# Patient Record
Sex: MALE | NOT HISPANIC OR LATINO | Employment: OTHER | URBAN - NONMETROPOLITAN AREA
[De-identification: names, ages, dates, MRNs, and addresses within clinical notes are randomized per-mention and may not be internally consistent; named-entity substitution may affect disease eponyms.]

---

## 2018-02-08 ENCOUNTER — HISTORICAL (OUTPATIENT)
Dept: ADMINISTRATIVE | Facility: HOSPITAL | Age: 83
End: 2018-02-08

## 2019-01-15 ENCOUNTER — HISTORICAL (OUTPATIENT)
Dept: ADMINISTRATIVE | Facility: HOSPITAL | Age: 84
End: 2019-01-15

## 2019-08-08 ENCOUNTER — HISTORICAL (OUTPATIENT)
Dept: ADMINISTRATIVE | Facility: HOSPITAL | Age: 84
End: 2019-08-08

## 2019-11-12 RX ORDER — LORATADINE 10 MG/1
10 TABLET ORAL DAILY
COMMUNITY

## 2019-11-12 RX ORDER — LISINOPRIL 2.5 MG/1
2.5 TABLET ORAL DAILY
COMMUNITY

## 2019-11-12 RX ORDER — METOPROLOL TARTRATE 25 MG/1
25 TABLET, FILM COATED ORAL 2 TIMES DAILY
COMMUNITY

## 2019-11-12 RX ORDER — FAMOTIDINE 20 MG/1
20 TABLET, FILM COATED ORAL 2 TIMES DAILY
COMMUNITY

## 2019-11-12 RX ORDER — CITALOPRAM 10 MG/1
10 TABLET ORAL DAILY
COMMUNITY

## 2019-11-12 RX ORDER — POTASSIUM CHLORIDE 750 MG/1
10 TABLET, EXTENDED RELEASE ORAL ONCE
COMMUNITY

## 2019-11-12 RX ORDER — FUROSEMIDE 20 MG/1
20 TABLET ORAL 2 TIMES DAILY
COMMUNITY

## 2019-11-12 RX ORDER — WARFARIN 6 MG/1
6 TABLET ORAL DAILY
COMMUNITY

## 2019-11-12 RX ORDER — TAMSULOSIN HYDROCHLORIDE 0.4 MG/1
0.4 CAPSULE ORAL DAILY
COMMUNITY

## 2019-11-12 RX ORDER — ATORVASTATIN CALCIUM 80 MG/1
80 TABLET, FILM COATED ORAL DAILY
COMMUNITY

## 2019-11-12 RX ORDER — LEVOTHYROXINE SODIUM 25 UG/1
25 TABLET ORAL DAILY
COMMUNITY

## 2019-11-12 RX ORDER — WARFARIN 2.5 MG/1
2.5 TABLET ORAL DAILY
COMMUNITY

## 2019-11-12 RX ORDER — DONEPEZIL HYDROCHLORIDE 10 MG/1
10 TABLET, FILM COATED ORAL NIGHTLY
COMMUNITY

## 2019-11-12 RX ORDER — LORAZEPAM 2 MG/1
0.5 TABLET ORAL EVERY 6 HOURS PRN
COMMUNITY

## 2019-11-12 RX ORDER — OLANZAPINE 2.5 MG/1
2.5 TABLET ORAL NIGHTLY
COMMUNITY

## 2019-11-13 ENCOUNTER — OFFICE VISIT (OUTPATIENT)
Dept: UROLOGY | Facility: CLINIC | Age: 84
End: 2019-11-13
Payer: MEDICARE

## 2019-11-13 VITALS — DIASTOLIC BLOOD PRESSURE: 70 MMHG | SYSTOLIC BLOOD PRESSURE: 115 MMHG | HEART RATE: 67 BPM

## 2019-11-13 DIAGNOSIS — N39.0 URINARY TRACT INFECTION WITHOUT HEMATURIA, SITE UNSPECIFIED: ICD-10-CM

## 2019-11-13 DIAGNOSIS — N40.1 BPH WITH OBSTRUCTION/LOWER URINARY TRACT SYMPTOMS: ICD-10-CM

## 2019-11-13 DIAGNOSIS — N13.8 BPH WITH OBSTRUCTION/LOWER URINARY TRACT SYMPTOMS: ICD-10-CM

## 2019-11-13 DIAGNOSIS — R33.9 INCOMPLETE BLADDER EMPTYING: Primary | ICD-10-CM

## 2019-11-13 LAB
ANION GAP SERPL CALC-SCNC: 8 MMOL/L (ref 3–11)
BUN SERPL-MCNC: 81 MG/DL (ref 7–18)
CALCIUM SERPL-MCNC: 9.3 MG/DL (ref 8.5–10.1)
CHLORIDE SERPL-SCNC: 93 MMOL/L (ref 98–107)
CO2 SERPL-SCNC: 38 MMOL/L (ref 21–32)
CREAT SERPL-MCNC: 1.33 MG/DL (ref 0.7–1.3)
GFR ESTIMATION: 50
GLUCOSE SERPL-MCNC: 114 MG/DL (ref 74–106)
POC RESIDUAL URINE VOLUME: 548 ML (ref 0–100)
POTASSIUM SERPL-SCNC: 3.7 MMOL/L (ref 3.5–5.1)
SODIUM BLD-SCNC: 139 MMOL/L (ref 131–143)

## 2019-11-13 PROCEDURE — 99214 OFFICE O/P EST MOD 30 MIN: CPT | Mod: S$GLB,,, | Performed by: NURSE PRACTITIONER

## 2019-11-13 PROCEDURE — 99214 PR OFFICE/OUTPT VISIT, EST, LEVL IV, 30-39 MIN: ICD-10-PCS | Mod: S$GLB,,, | Performed by: NURSE PRACTITIONER

## 2019-11-13 PROCEDURE — 51798 POCT BLADDER SCAN: ICD-10-PCS | Mod: S$GLB,,, | Performed by: NURSE PRACTITIONER

## 2019-11-13 PROCEDURE — 51798 US URINE CAPACITY MEASURE: CPT | Mod: S$GLB,,, | Performed by: NURSE PRACTITIONER

## 2019-11-13 NOTE — PROGRESS NOTES
Subjective:       Patient ID: Lemuel Boudreaux is a 89 y.o. male.    Chief Complaint: No chief complaint on file.      HPI: A 89-year-old Zeyad demented  male seen on follow-up today for incomplete bladder emptying.  On Flomax.  Unable to contribute to subjective interview.  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    B-COMPLEX WITH VITAMIN C (VITAMIN B COMPLEX-C ORAL)    Take by mouth.    CITALOPRAM (CELEXA) 10 MG TABLET    Take 10 mg by mouth once daily.    DONEPEZIL (ARICEPT) 10 MG TABLET    Take 10 mg by mouth every evening.    FAMOTIDINE (PEPCID) 20 MG TABLET    Take 20 mg by mouth 2 (two) times daily.    FUROSEMIDE (LASIX) 20 MG TABLET    Take 20 mg by mouth 2 (two) times daily.    LEVOTHYROXINE (SYNTHROID) 25 MCG TABLET    Take 25 mcg by mouth once daily.    LISINOPRIL (PRINIVIL,ZESTRIL) 2.5 MG TABLET    Take 2.5 mg by mouth once daily.    LORATADINE (CLARITIN) 10 MG TABLET    Take 10 mg by mouth once daily.    LORAZEPAM (ATIVAN) 2 MG TAB    Take 0.5 mg by mouth every 6 (six) hours as needed.    METOPROLOL TARTRATE (LOPRESSOR) 25 MG TABLET    Take 25 mg by mouth 2 (two) times daily.    OLANZAPINE (ZYPREXA) 2.5 MG TABLET    Take 2.5 mg by mouth every evening.    ONDANSETRON HCL (ZOFRAN ORAL)    Take by mouth.    POTASSIUM CHLORIDE (KLOR-CON) 10 MEQ TBSR    Take 10 mEq by mouth once.     SERTRALINE HCL (ZOLOFT ORAL)    Take by mouth.    TAMSULOSIN (FLOMAX) 0.4 MG CAP    Take 0.4 mg by mouth once daily.    WARFARIN (COUMADIN) 2.5 MG TABLET    Take 2.5 mg by mouth once daily.    WARFARIN (COUMADIN) 6 MG TABLET    Take 6 mg by mouth once daily.        Past Social History:   Social History     Socioeconomic History    Marital status:      Spouse name: Not on file    Number of children: Not on file    Years of education: Not on file    Highest education level: Not on file   Occupational History    Not on file   Social Needs    Financial resource strain: Not on file    Food insecurity:     Worry: Not on file     Inability: Not on file    Transportation needs:     Medical: Not on file     Non-medical: Not on file   Tobacco Use    Smoking status: Never Smoker   Substance and Sexual Activity    Alcohol use: Not Currently    Drug use: Not on file    Sexual activity: Not on file   Lifestyle    Physical activity:     Days per week: Not on file     Minutes per session: Not on file    Stress: Not on file   Relationships    Social connections:     Talks on phone: Not on file     Gets together: Not on file     Attends Holiness service: Not on file     Active member of club or organization: Not on file     Attends meetings of clubs or organizations: Not on file     Relationship status: Not on file   Other Topics Concern    Not on file   Social History Narrative    Not on file       Allergies:   Review of patient's allergies indicates:   Allergen Reactions    Cefdinir         Family History: History reviewed. No pertinent family history.     Review of Systems:  Review of Systems   Constitutional: Negative for activity change and appetite change.   HENT: Negative for congestion and dental problem.    Respiratory: Negative for chest tightness and shortness of breath.    Cardiovascular: Negative for chest pain.   Gastrointestinal: Negative for abdominal distention and abdominal pain.    Genitourinary: Negative for decreased urine volume, difficulty urinating, discharge, dysuria, enuresis, flank pain, frequency, genital sores, hematuria, penile pain, penile swelling, scrotal swelling, testicular pain and urgency.   Musculoskeletal: Negative for back pain and neck pain.   Neurological: Negative for dizziness.   Hematological: Negative for adenopathy.   Psychiatric/Behavioral: Negative for agitation, behavioral problems and confusion.       Physical Exam:  Physical Exam   Constitutional: He is oriented to person, place, and time. He appears well-developed and well-nourished.   HENT:   Head: Normocephalic.   Eyes: No scleral icterus.   Neck: Normal range of motion.   Cardiovascular: Intact distal pulses.    Pulmonary/Chest: Effort normal and breath sounds normal.   Abdominal: Soft. He exhibits no distension. There is no tenderness. No hernia. Hernia confirmed negative in the right inguinal area and confirmed negative in the left inguinal area.   Genitourinary: Testes normal and penis normal. Cremasteric reflex is present.   Neurological: He is alert and oriented to person, place, and time.   Skin: Skin is warm and dry.     Psychiatric: His behavior is normal. Cognition and memory are impaired (Dementia, hearing loss; unable to contribute 2 subjective interview).       Assessment/Plan:       Problem List Items Addressed This Visit        Renal/    Incomplete bladder emptying - Primary    Current Assessment & Plan     Rising postvoid residual now up to 548 cc.  Order given nursing home staff to insert 14-16 French indwelling Villavicencio to  bag.  [Office environment not suitable to safely handle patient out of wheelchair onto table for catheterization insert here.]  Will re-evaluate patient 1 month and electrodes office, sooner as needed.  BMP today         UTI (urinary tract infection)    Current Assessment & Plan     Nitrite negative, microscopically WBC too numerous to count RBC 0 to 3-for episode 4+  bacteria on a voided specimen culture urine         BPH with obstruction/lower urinary tract symptoms    Current Assessment & Plan     On Flomax with poor outcome, rising PVR.

## 2019-11-13 NOTE — ASSESSMENT & PLAN NOTE
Rising postvoid residual now up to 548 cc.  Order given nursing home staff to insert 14-16 French indwelling Villavicencio to  bag.  [Office environment not suitable to safely handle patient out of wheelchair onto table for catheterization insert here.]  Will re-evaluate patient 1 month and electrodes office, sooner as needed.  BMP today

## 2019-11-13 NOTE — ASSESSMENT & PLAN NOTE
Nitrite negative, microscopically WBC too numerous to count RBC 0 to 3-for episode 4+ bacteria on a voided specimen; culture urine, patient afebrile asymptomatic will hold antibiotics pending sensitivities

## 2019-11-21 ENCOUNTER — TELEPHONE (OUTPATIENT)
Dept: UROLOGY | Facility: CLINIC | Age: 84
End: 2019-11-21

## 2019-11-22 NOTE — TELEPHONE ENCOUNTER
----- Message from Enoc Kendrick NP sent at 11/14/2019 11:53 AM CST -----  Please call the patient regarding his abnormal result.  Notify care facility of abnormal findings of basic metabolic panel, patient was in urinary retention, forward to PCP for further evaluation and treatment if indicated

## 2019-12-17 ENCOUNTER — OFFICE VISIT (OUTPATIENT)
Dept: UROLOGY | Facility: CLINIC | Age: 84
End: 2019-12-17
Payer: MEDICARE

## 2019-12-17 VITALS — RESPIRATION RATE: 18 BRPM | BODY MASS INDEX: 21.76 KG/M2 | WEIGHT: 152 LBS | HEART RATE: 76 BPM | HEIGHT: 70 IN

## 2019-12-17 DIAGNOSIS — N40.1 BPH WITH OBSTRUCTION/LOWER URINARY TRACT SYMPTOMS: ICD-10-CM

## 2019-12-17 DIAGNOSIS — R33.9 INCOMPLETE BLADDER EMPTYING: Primary | ICD-10-CM

## 2019-12-17 DIAGNOSIS — R82.90 ABNORMAL URINALYSIS: ICD-10-CM

## 2019-12-17 DIAGNOSIS — Z97.8 PRESENCE OF INDWELLING FOLEY CATHETER: ICD-10-CM

## 2019-12-17 DIAGNOSIS — N13.8 BPH WITH OBSTRUCTION/LOWER URINARY TRACT SYMPTOMS: ICD-10-CM

## 2019-12-17 LAB
BILIRUB UR QL STRIP: NEGATIVE
GLUCOSE UR QL STRIP: NEGATIVE
KETONES UR QL STRIP: NEGATIVE
LEUKOCYTE ESTERASE UR QL STRIP: POSITIVE
PH, POC UA: 7
POC AMORP, URINE: ABNORMAL
POC BACTI, URINE: ABNORMAL
POC BLOOD, URINE: POSITIVE
POC CASTS, URINE: ABNORMAL
POC CRYST, URINE: ABNORMAL
POC EPITH, URINE: ABNORMAL
POC HCG, URINE: ABNORMAL
POC HYALIN, URINE: ABNORMAL
POC MUCUS, URINE: ABNORMAL
POC NITRATES, URINE: NEGATIVE
POC OTHER, URINE: ABNORMAL
POC RBC, URINE: ABNORMAL HPF
POC WBC, URINE: ABNORMAL HPF
PROT UR QL STRIP: POSITIVE
SP GR UR STRIP: 1.01 (ref 1–1.03)
UROBILINOGEN UR STRIP-ACNC: 0.2 (ref 0.3–2.2)

## 2019-12-17 PROCEDURE — 1159F PR MEDICATION LIST DOCUMENTED IN MEDICAL RECORD: ICD-10-PCS | Mod: S$GLB,,, | Performed by: NURSE PRACTITIONER

## 2019-12-17 PROCEDURE — 99213 PR OFFICE/OUTPT VISIT, EST, LEVL III, 20-29 MIN: ICD-10-PCS | Mod: S$GLB,,, | Performed by: NURSE PRACTITIONER

## 2019-12-17 PROCEDURE — 1159F MED LIST DOCD IN RCRD: CPT | Mod: S$GLB,,, | Performed by: NURSE PRACTITIONER

## 2019-12-17 PROCEDURE — 99213 OFFICE O/P EST LOW 20 MIN: CPT | Mod: S$GLB,,, | Performed by: NURSE PRACTITIONER

## 2019-12-17 NOTE — PROGRESS NOTES
Subjective:       Patient ID: Lemuel Boudreaux is a 89 y.o. male.    Chief Complaint: Follow-up (1 mth f/u)      HPI: 89-year-old male presents for 1 month re-evaluation.  Patient was seen 1 month ago, secondary to incomplete bladder emptying and BPH with obstruction, as well as urinary retention.  Patient had a bladder scan at that time 548 cc.  Patient had had a Villavicencio placed prior, was DC'd but the patient was unable to void so Villavicencio was put back in and has been there since.  Patient is on Flomax 1 a day.  Patient and caretaker denies odor to urine.  Patient does state the Villavicencio is uncomfortable.  Denies blood in the bag.  No other urinary complaints.  All the problems are stable.      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    B-COMPLEX WITH VITAMIN C (VITAMIN B COMPLEX-C ORAL)    Take by mouth.    CITALOPRAM (CELEXA) 10 MG TABLET    Take 10 mg by mouth once daily.    DONEPEZIL (ARICEPT) 10 MG TABLET    Take 10 mg by mouth every evening.    FAMOTIDINE (PEPCID) 20 MG TABLET    Take 20 mg by mouth 2 (two) times daily.    FUROSEMIDE (LASIX) 20 MG TABLET    Take 20 mg by mouth 2 (two) times daily.    LEVOTHYROXINE (SYNTHROID) 25 MCG TABLET    Take 25 mcg by mouth once daily.    LISINOPRIL (PRINIVIL,ZESTRIL) 2.5 MG TABLET    Take 2.5 mg by mouth once daily.    LORATADINE (CLARITIN) 10 MG TABLET    Take 10 mg by mouth once daily.    LORAZEPAM  (ATIVAN) 2 MG TAB    Take 0.5 mg by mouth every 6 (six) hours as needed.    METOPROLOL TARTRATE (LOPRESSOR) 25 MG TABLET    Take 25 mg by mouth 2 (two) times daily.    OLANZAPINE (ZYPREXA) 2.5 MG TABLET    Take 2.5 mg by mouth every evening.    ONDANSETRON HCL (ZOFRAN ORAL)    Take by mouth.    POTASSIUM CHLORIDE (KLOR-CON) 10 MEQ TBSR    Take 10 mEq by mouth once.    SERTRALINE HCL (ZOLOFT ORAL)    Take by mouth.    TAMSULOSIN (FLOMAX) 0.4 MG CAP    Take 0.4 mg by mouth once daily.    WARFARIN (COUMADIN) 2.5 MG TABLET    Take 2.5 mg by mouth once daily.    WARFARIN (COUMADIN) 6 MG TABLET    Take 6 mg by mouth once daily.        Past Social History:   Social History     Socioeconomic History    Marital status:      Spouse name: Not on file    Number of children: Not on file    Years of education: Not on file    Highest education level: Not on file   Occupational History    Not on file   Social Needs    Financial resource strain: Not on file    Food insecurity:     Worry: Not on file     Inability: Not on file    Transportation needs:     Medical: Not on file     Non-medical: Not on file   Tobacco Use    Smoking status: Never Smoker   Substance and Sexual Activity    Alcohol use: Not Currently    Drug use: Not on file    Sexual activity: Not on file   Lifestyle    Physical activity:     Days per week: Not on file     Minutes per session: Not on file    Stress: Not on file   Relationships    Social connections:     Talks on phone: Not on file     Gets together: Not on file     Attends Adventism service: Not on file     Active member of club or organization: Not on file     Attends meetings of clubs or organizations: Not on file     Relationship status: Not on file   Other Topics Concern    Not on file   Social History Narrative    Not on file       Allergies:   Review of patient's allergies indicates:   Allergen Reactions    Cefdinir         Family History: History reviewed. No pertinent  family history.     Review of Systems:  Review of Systems   Constitutional: Negative for activity change and appetite change.   HENT: Negative for congestion and dental problem.    Respiratory: Negative for chest tightness and shortness of breath.    Cardiovascular: Negative for chest pain.   Gastrointestinal: Negative for abdominal distention and abdominal pain.   Genitourinary: Negative for decreased urine volume, difficulty urinating, discharge, dysuria, enuresis, flank pain, frequency, genital sores, hematuria, penile pain, penile swelling, scrotal swelling, testicular pain and urgency.   Musculoskeletal: Negative for back pain and neck pain.   Neurological: Negative for dizziness.   Hematological: Negative for adenopathy.   Psychiatric/Behavioral: Negative for agitation, behavioral problems and confusion.       Physical Exam:  Physical Exam   Nursing note and vitals reviewed.  Constitutional: He is oriented to person, place, and time. He appears well-developed and well-nourished.   Patient is hard of hearing, and has dementia.   HENT:   Head: Normocephalic.   Cardiovascular: Normal rate, regular rhythm and normal heart sounds.    Pulmonary/Chest: Effort normal and breath sounds normal.   Abdominal: Soft. Bowel sounds are normal.   Neurological: He is alert and oriented to person, place, and time.   Skin: Skin is warm and dry.      Urinalysis:  White blood cells 20/30, leukocytes moderate, epithelials 1+, bacteria 2+.  Blood small, red blood cells 5-10    Assessment/Plan:   1.  Incomplete bladder doing with BPH with obstruction:  Will continue with the Villavicencio.  [facility]  Can continue Flomax as directed.  2.  Abnormal urinalysis:  Will culture the urine and treat appropriately.  Will plan follow-up in 3 months, sooner if needed.  Problem List Items Addressed This Visit        Renal/    Incomplete bladder emptying - Primary    BPH with obstruction/lower  urinary tract symptoms      Other Visit Diagnoses     Presence of indwelling Villavicencio catheter        Relevant Orders    POCT Urinalysis w/ Microscope    Abnormal urinalysis        Relevant Orders    Urine culture

## 2019-12-20 ENCOUNTER — TELEPHONE (OUTPATIENT)
Dept: UROLOGY | Facility: CLINIC | Age: 84
End: 2019-12-20

## 2019-12-20 RX ORDER — SULFAMETHOXAZOLE AND TRIMETHOPRIM 800; 160 MG/1; MG/1
1 TABLET ORAL 2 TIMES DAILY
Qty: 14 TABLET | Refills: 0
Start: 2019-12-20 | End: 2019-12-27

## 2020-03-18 ENCOUNTER — OFFICE VISIT (OUTPATIENT)
Dept: UROLOGY | Facility: CLINIC | Age: 85
End: 2020-03-18
Payer: MEDICARE

## 2020-03-18 VITALS — SYSTOLIC BLOOD PRESSURE: 115 MMHG | DIASTOLIC BLOOD PRESSURE: 74 MMHG | HEART RATE: 68 BPM

## 2020-03-18 DIAGNOSIS — R33.9 INCOMPLETE BLADDER EMPTYING: ICD-10-CM

## 2020-03-18 DIAGNOSIS — R82.71 BACILLURIA: Primary | ICD-10-CM

## 2020-03-18 LAB
BILIRUB UR QL STRIP: NEGATIVE
GLUCOSE UR QL STRIP: NEGATIVE
KETONES UR QL STRIP: NEGATIVE
LEUKOCYTE ESTERASE UR QL STRIP: POSITIVE
PH, POC UA: >=9
POC AMORP, URINE: ABNORMAL
POC BACTI, URINE: ABNORMAL
POC BLOOD, URINE: POSITIVE
POC CASTS, URINE: ABNORMAL
POC CRYST, URINE: ABNORMAL
POC EPITH, URINE: ABNORMAL
POC HCG, URINE: ABNORMAL
POC HYALIN, URINE: ABNORMAL LPF
POC MUCUS, URINE: ABNORMAL
POC NITRATES, URINE: NEGATIVE
POC OTHER, URINE: ABNORMAL
POC RBC, URINE: ABNORMAL HPF
POC WBC, URINE: ABNORMAL HPF
PROT UR QL STRIP: NEGATIVE
SP GR UR STRIP: 1.01 (ref 1–1.03)
UROBILINOGEN UR STRIP-ACNC: 0.2 (ref 0.3–2.2)

## 2020-03-18 PROCEDURE — 99213 PR OFFICE/OUTPT VISIT, EST, LEVL III, 20-29 MIN: ICD-10-PCS | Mod: S$GLB,,, | Performed by: UROLOGY

## 2020-03-18 PROCEDURE — 99213 OFFICE O/P EST LOW 20 MIN: CPT | Mod: S$GLB,,, | Performed by: UROLOGY

## 2020-03-18 NOTE — PROGRESS NOTES
Subjective:       Patient ID: Lemuel Boudreaux is a 89 y.o. male.    Chief Complaint: Other (3 month fu )      HPI: 89-year-old frail male known service Dr. Bela baldwin with history of incomplete bladder emptying.  Positive for indwelling Villavicencio catheter  bag.  Presents today with no complaints.  There is no blood in the bag patient has been afebrile and per caregiver present he has done real well over the last month or so.  No unexplained weight loss.  Appetite is good.  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    B-COMPLEX WITH VITAMIN C (VITAMIN B COMPLEX-C ORAL)    Take by mouth.    CITALOPRAM (CELEXA) 10 MG TABLET    Take 10 mg by mouth once daily.    DONEPEZIL (ARICEPT) 10 MG TABLET    Take 10 mg by mouth every evening.    FAMOTIDINE (PEPCID) 20 MG TABLET    Take 20 mg by mouth 2 (two) times daily.    FUROSEMIDE (LASIX) 20 MG TABLET    Take 20 mg by mouth 2 (two) times daily.    LEVOTHYROXINE (SYNTHROID) 25 MCG TABLET    Take 25 mcg by mouth once daily.    LISINOPRIL (PRINIVIL,ZESTRIL) 2.5 MG TABLET    Take 2.5 mg by mouth once daily.    LORATADINE (CLARITIN) 10 MG TABLET    Take 10 mg by mouth once daily.    LORAZEPAM (ATIVAN) 2 MG TAB    Take 0.5 mg by mouth every 6 (six) hours as needed.    METOPROLOL TARTRATE (LOPRESSOR) 25 MG TABLET    Take 25 mg by mouth 2 (two) times daily.     OLANZAPINE (ZYPREXA) 2.5 MG TABLET    Take 2.5 mg by mouth every evening.    ONDANSETRON HCL (ZOFRAN ORAL)    Take by mouth.    POTASSIUM CHLORIDE (KLOR-CON) 10 MEQ TBSR    Take 10 mEq by mouth once.    SERTRALINE HCL (ZOLOFT ORAL)    Take by mouth.    TAMSULOSIN (FLOMAX) 0.4 MG CAP    Take 0.4 mg by mouth once daily.    WARFARIN (COUMADIN) 2.5 MG TABLET    Take 2.5 mg by mouth once daily.    WARFARIN (COUMADIN) 6 MG TABLET    Take 6 mg by mouth once daily.        Past Social History:   Social History     Socioeconomic History    Marital status:      Spouse name: Not on file    Number of children: Not on file    Years of education: Not on file    Highest education level: Not on file   Occupational History    Not on file   Social Needs    Financial resource strain: Not on file    Food insecurity:     Worry: Not on file     Inability: Not on file    Transportation needs:     Medical: Not on file     Non-medical: Not on file   Tobacco Use    Smoking status: Never Smoker   Substance and Sexual Activity    Alcohol use: Not Currently    Drug use: Not on file    Sexual activity: Not on file   Lifestyle    Physical activity:     Days per week: Not on file     Minutes per session: Not on file    Stress: Not on file   Relationships    Social connections:     Talks on phone: Not on file     Gets together: Not on file     Attends Pentecostalism service: Not on file     Active member of club or organization: Not on file     Attends meetings of clubs or organizations: Not on file     Relationship status: Not on file   Other Topics Concern    Not on file   Social History Narrative    Not on file       Allergies:   Review of patient's allergies indicates:   Allergen Reactions    Cefdinir         Family History: History reviewed. No pertinent family history.     Review of Systems:  Review of Systems   Constitutional: Negative for activity change and appetite change.   HENT: Negative for congestion and dental  problem.    Respiratory: Negative for chest tightness and shortness of breath.    Cardiovascular: Negative for chest pain.   Gastrointestinal: Negative for abdominal distention and abdominal pain.   Genitourinary: Negative for decreased urine volume, difficulty urinating, discharge, dysuria, enuresis, flank pain, frequency, genital sores, hematuria, penile pain, penile swelling, scrotal swelling, testicular pain and urgency.   Musculoskeletal: Negative for back pain and neck pain.   Neurological: Negative for dizziness.   Hematological: Negative for adenopathy.   Psychiatric/Behavioral: Negative for agitation, behavioral problems and confusion.       Physical Exam:  Physical Exam   Constitutional: He is oriented to person, place, and time. He appears well-developed and well-nourished.   HENT:   Head: Normocephalic.   Eyes: No scleral icterus.   Neck: Normal range of motion.   Cardiovascular: Intact distal pulses.    Pulmonary/Chest: Effort normal and breath sounds normal.   Abdominal: Soft. He exhibits no distension. There is no tenderness. No hernia. Hernia confirmed negative in the right inguinal area and confirmed negative in the left inguinal area.   Genitourinary: Testes normal and penis normal. Cremasteric reflex is present.   Neurological: He is alert and oriented to person, place, and time.   Skin: Skin is warm and dry.     Psychiatric: He has a normal mood and affect.       Assessment/Plan:     incomplete bladder emptying--suspect hypotonic bladder.  Patient has indwelling Villavicencio and tolerating well.  Urinalysis 3-5 wbc's, 3-5 rbc's and 1+ bacteria will culture treat if indicated.  Return to clinic 3 months sooner as needed  Problem List Items Addressed This Visit     None

## 2020-03-20 ENCOUNTER — TELEPHONE (OUTPATIENT)
Dept: UROLOGY | Facility: CLINIC | Age: 85
End: 2020-03-20

## 2020-03-20 LAB — URINE CULTURE, ROUTINE: NORMAL

## 2020-03-20 NOTE — TELEPHONE ENCOUNTER
----- Message from Enoc Kendrick NP sent at 3/20/2020 11:04 AM CDT -----  Call nursing home notify caregiver patient's urine is positive for Proteus mirabilis.  Treat with Amickacin 250 mg IM b.i.d. x5 days then discontinue

## 2020-03-20 NOTE — TELEPHONE ENCOUNTER
NOTIFIED care facility ([facility]) of results and abx orders.  Faxed results/orders to care facility.

## 2020-11-11 ENCOUNTER — OFFICE VISIT (OUTPATIENT)
Dept: UROLOGY | Facility: CLINIC | Age: 85
End: 2020-11-11
Payer: MEDICARE

## 2020-11-11 VITALS
HEIGHT: 70 IN | DIASTOLIC BLOOD PRESSURE: 74 MMHG | SYSTOLIC BLOOD PRESSURE: 136 MMHG | WEIGHT: 152 LBS | BODY MASS INDEX: 21.76 KG/M2 | HEART RATE: 80 BPM

## 2020-11-11 DIAGNOSIS — R33.9 INCOMPLETE BLADDER EMPTYING: Primary | ICD-10-CM

## 2020-11-11 PROCEDURE — 99213 PR OFFICE/OUTPT VISIT, EST, LEVL III, 20-29 MIN: ICD-10-PCS | Mod: S$GLB,,, | Performed by: UROLOGY

## 2020-11-11 PROCEDURE — 99213 OFFICE O/P EST LOW 20 MIN: CPT | Mod: S$GLB,,, | Performed by: UROLOGY

## 2020-11-11 NOTE — PROGRESS NOTES
Subjective:       Patient ID: Lemuel Boudreaux is a 89 y.o. male.    Chief Complaint: Incomplete bladder emptying (F/U)      HPI: 89 yo male with indwelling valadez.  Patient is tolerating it well.  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    B-COMPLEX WITH VITAMIN C (VITAMIN B COMPLEX-C ORAL)    Take by mouth.    CITALOPRAM (CELEXA) 10 MG TABLET    Take 10 mg by mouth once daily.    DONEPEZIL (ARICEPT) 10 MG TABLET    Take 10 mg by mouth every evening.    FAMOTIDINE (PEPCID) 20 MG TABLET    Take 20 mg by mouth 2 (two) times daily.    FUROSEMIDE (LASIX) 20 MG TABLET    Take 20 mg by mouth 2 (two) times daily.    LEVOTHYROXINE (SYNTHROID) 25 MCG TABLET    Take 25 mcg by mouth once daily.    LISINOPRIL (PRINIVIL,ZESTRIL) 2.5 MG TABLET    Take 2.5 mg by mouth once daily.    LORATADINE (CLARITIN) 10 MG TABLET    Take 10 mg by mouth once daily.    LORAZEPAM (ATIVAN) 2 MG TAB    Take 0.5 mg by mouth every 6 (six) hours as needed.    METOPROLOL TARTRATE (LOPRESSOR) 25 MG TABLET    Take 25 mg by mouth 2 (two) times daily.    OLANZAPINE (ZYPREXA) 2.5 MG TABLET    Take 2.5 mg by mouth every evening.    ONDANSETRON HCL (ZOFRAN ORAL)    Take by mouth.    POTASSIUM CHLORIDE (KLOR-CON) 10 MEQ TBSR    Take 10 mEq by mouth once.    SERTRALINE HCL (ZOLOFT ORAL)    Take by mouth.    TAMSULOSIN (FLOMAX) 0.4 MG CAP    Take 0.4 mg  by mouth once daily.    WARFARIN (COUMADIN) 2.5 MG TABLET    Take 2.5 mg by mouth once daily.    WARFARIN (COUMADIN) 6 MG TABLET    Take 6 mg by mouth once daily.        Past Social History:   Social History     Socioeconomic History    Marital status:      Spouse name: Not on file    Number of children: Not on file    Years of education: Not on file    Highest education level: Not on file   Occupational History    Not on file   Social Needs    Financial resource strain: Not on file    Food insecurity     Worry: Not on file     Inability: Not on file    Transportation needs     Medical: Not on file     Non-medical: Not on file   Tobacco Use    Smoking status: Never Smoker   Substance and Sexual Activity    Alcohol use: Not Currently    Drug use: Not on file    Sexual activity: Not on file   Lifestyle    Physical activity     Days per week: Not on file     Minutes per session: Not on file    Stress: Not on file   Relationships    Social connections     Talks on phone: Not on file     Gets together: Not on file     Attends Yazdanism service: Not on file     Active member of club or organization: Not on file     Attends meetings of clubs or organizations: Not on file     Relationship status: Not on file   Other Topics Concern    Not on file   Social History Narrative    Not on file       Allergies:   Review of patient's allergies indicates:   Allergen Reactions    Cefdinir         Family History: History reviewed. No pertinent family history.     Review of Systems:  Review of Systems   Constitutional: Negative for activity change and appetite change.   HENT: Negative for congestion and dental problem.    Respiratory: Negative for chest tightness and shortness of breath.    Cardiovascular: Negative for chest pain.   Gastrointestinal: Negative for abdominal distention and abdominal pain.   Genitourinary: Negative for decreased urine volume, difficulty urinating, discharge, dysuria, enuresis,  flank pain, frequency, genital sores, hematuria, penile pain, penile swelling, scrotal swelling, testicular pain and urgency.   Musculoskeletal: Negative for back pain and neck pain.   Neurological: Negative for dizziness.   Hematological: Negative for adenopathy.   Psychiatric/Behavioral: Negative for agitation, behavioral problems and confusion.       Physical Exam:  Physical Exam  Vitals signs and nursing note reviewed.   Constitutional:       Appearance: He is well-developed.   HENT:      Head: Normocephalic.   Cardiovascular:      Rate and Rhythm: Normal rate and regular rhythm.      Heart sounds: Normal heart sounds.   Pulmonary:      Effort: Pulmonary effort is normal.      Breath sounds: Normal breath sounds.   Abdominal:      General: Bowel sounds are normal.      Palpations: Abdomen is soft.   Skin:     General: Skin is warm and dry.   Neurological:      Mental Status: He is alert and oriented to person, place, and time.     ua--looks good    Assessment/Plan:     urinary retention doing well with valadez--fu in 4 months  Problem List Items Addressed This Visit        Renal/    Incomplete bladder emptying - Primary    Relevant Orders    POCT Urinalysis (w/Micro Option)

## 2020-12-16 ENCOUNTER — OUTSIDE PLACE OF SERVICE (OUTPATIENT)
Dept: UROLOGY | Facility: CLINIC | Age: 85
End: 2020-12-16
Payer: MEDICARE

## 2020-12-16 PROCEDURE — 99223 PR INITIAL HOSPITAL CARE,LEVL III: ICD-10-PCS | Mod: ,,, | Performed by: SPECIALIST

## 2020-12-16 PROCEDURE — 99223 1ST HOSP IP/OBS HIGH 75: CPT | Mod: ,,, | Performed by: SPECIALIST

## 2020-12-28 ENCOUNTER — OFFICE VISIT (OUTPATIENT)
Dept: UROLOGY | Facility: CLINIC | Age: 85
End: 2020-12-28
Payer: MEDICARE

## 2020-12-28 DIAGNOSIS — R33.9 INCOMPLETE BLADDER EMPTYING: Primary | ICD-10-CM

## 2020-12-28 DIAGNOSIS — R82.71 BACILLURIA: ICD-10-CM

## 2020-12-28 PROCEDURE — 99214 PR OFFICE/OUTPT VISIT, EST, LEVL IV, 30-39 MIN: ICD-10-PCS | Mod: S$GLB,,, | Performed by: NURSE PRACTITIONER

## 2020-12-28 PROCEDURE — 99214 OFFICE O/P EST MOD 30 MIN: CPT | Mod: S$GLB,,, | Performed by: NURSE PRACTITIONER

## 2020-12-28 NOTE — PROGRESS NOTES
Subjective:       Patient ID: Lemuel Boudreaux is a 89 y.o. male.    Chief Complaint: Hospital Follow Up      HPI: 89-year-old male known service Dr. Bela baldwin seen today hospital discharge.  Patient was admitted for gross hematuria.  Patient was seen by Dr. Harrington on call.  Findings were suggested that patient may have took gone catheter which initiated the bleeding.  He is on Coumadin.  Imaging did not identify any acute pathology.  His urine was still dark and bloody at the time of consultation.  He was placed on CBI.  Patient was subsequent discharged presents today for follow-up.  Per patient caregiver no further problems since hospital discharge.  The urine in the tubing in  bag has been clear.  Patient has been afebrile.  He has had no complaints about catheter pain.  Appetite is good.  No constipation.       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    B-COMPLEX WITH VITAMIN C (VITAMIN B COMPLEX-C ORAL)    Take by mouth.    CITALOPRAM (CELEXA) 10 MG TABLET    Take 10 mg by mouth once daily.    DONEPEZIL (ARICEPT) 10 MG TABLET    Take 10 mg by mouth every evening.    FAMOTIDINE (PEPCID) 20 MG TABLET    Take 20 mg by mouth 2 (two) times daily.    FUROSEMIDE (LASIX) 20 MG TABLET    Take 20 mg by mouth 2 (two) times daily.    LEVOTHYROXINE (SYNTHROID) 25 MCG TABLET    Take 25 mcg by mouth once daily.    LISINOPRIL (PRINIVIL,ZESTRIL) 2.5 MG  TABLET    Take 2.5 mg by mouth once daily.    LORATADINE (CLARITIN) 10 MG TABLET    Take 10 mg by mouth once daily.    LORAZEPAM (ATIVAN) 2 MG TAB    Take 0.5 mg by mouth every 6 (six) hours as needed.    METOPROLOL TARTRATE (LOPRESSOR) 25 MG TABLET    Take 25 mg by mouth 2 (two) times daily.    OLANZAPINE (ZYPREXA) 2.5 MG TABLET    Take 2.5 mg by mouth every evening.    ONDANSETRON HCL (ZOFRAN ORAL)    Take by mouth.    POTASSIUM CHLORIDE (KLOR-CON) 10 MEQ TBSR    Take 10 mEq by mouth once.    SERTRALINE HCL (ZOLOFT ORAL)    Take by mouth.    TAMSULOSIN (FLOMAX) 0.4 MG CAP    Take 0.4 mg by mouth once daily.    WARFARIN (COUMADIN) 2.5 MG TABLET    Take 2.5 mg by mouth once daily.    WARFARIN (COUMADIN) 6 MG TABLET    Take 6 mg by mouth once daily.        Past Social History:   Social History     Socioeconomic History    Marital status:      Spouse name: Not on file    Number of children: Not on file    Years of education: Not on file    Highest education level: Not on file   Occupational History    Not on file   Social Needs    Financial resource strain: Not on file    Food insecurity     Worry: Not on file     Inability: Not on file    Transportation needs     Medical: Not on file     Non-medical: Not on file   Tobacco Use    Smoking status: Never Smoker   Substance and Sexual Activity    Alcohol use: Not Currently    Drug use: Not on file    Sexual activity: Not on file   Lifestyle    Physical activity     Days per week: Not on file     Minutes per session: Not on file    Stress: Not on file   Relationships    Social connections     Talks on phone: Not on file     Gets together: Not on file     Attends [employer]ism service: Not on file     Active member of club or organization: Not on file     Attends meetings of clubs or organizations: Not on file     Relationship status: Not on file   Other Topics Concern    Not on file   Social History Narrative    Not on file       Allergies:   Review of  patient's allergies indicates:   Allergen Reactions    Cefdinir         Family History: History reviewed. No pertinent family history.     Review of Systems:  Review of Systems   Constitutional: Negative for activity change and appetite change.   HENT: Negative for congestion and dental problem.    Eyes: Negative for visual disturbance.   Respiratory: Negative for chest tightness and shortness of breath.    Cardiovascular: Negative for chest pain.   Gastrointestinal: Negative for abdominal distention and abdominal pain.   Genitourinary: Positive for difficulty urinating and hematuria. Negative for decreased urine volume, discharge, dysuria, enuresis, flank pain, frequency, genital sores, penile pain, penile swelling, scrotal swelling, testicular pain and urgency.   Musculoskeletal: Negative for back pain and neck pain.   Skin: Negative for color change.   Neurological: Negative for dizziness.   Hematological: Negative for adenopathy.   Psychiatric/Behavioral: Negative for agitation, behavioral problems and confusion.       Physical Exam:  Physical Exam  Constitutional:       Appearance: He is well-developed.   HENT:      Head: Normocephalic.   Eyes:      General: No scleral icterus.  Neck:      Musculoskeletal: Normal range of motion.   Pulmonary:      Effort: Pulmonary effort is normal.      Breath sounds: Normal breath sounds.   Abdominal:      General: There is no distension.      Palpations: Abdomen is soft.      Tenderness: There is no abdominal tenderness.      Hernia: No hernia is present. There is no hernia in the right inguinal area or left inguinal area.   Genitourinary:     Penis: Normal.       Scrotum/Testes: Normal. Cremasteric reflex is present.   Skin:     General: Skin is warm and dry.   Neurological:      Mental Status: He is alert and oriented to person, place, and time.         Assessment/Plan:   Gross hematuria--resolved status post CBI in the hospital    Incomplete bladder emptying--long-term  indwelling Villavicencio.  This was change recently in the hospital setting.  Return to clinic in 4 months for re-evaluation sooner as needed.  Nursing home changes catheter per standing order.    Bacilluria - urinalysis today WBC 6-10, RBC 10-15, no skin cells, 1+ bacteria.  Patient is asymptomatic.  Culture urine and treat if Pseudomonas, Proteus, Klebsiella or MRSA        Problem List Items Addressed This Visit        Renal/    Incomplete bladder emptying - Primary    Relevant Orders    POCT Urinalysis (w/Micro Option)      Other Visit Diagnoses     Bacilluria        Relevant Orders    Urine culture

## 2020-12-30 LAB — URINE CULTURE, ROUTINE: NORMAL
